# Patient Record
(demographics unavailable — no encounter records)

---

## 2025-04-07 NOTE — PROCEDURE
[FreeTextEntry3] : Ultrasound left breast  Patient reports left nipple discharge  Targeted exam demonstrates a dilated retroareolar duct with debris  Core biopsy was recommended  BI-RADS 4

## 2025-04-07 NOTE — ASSESSMENT
[FreeTextEntry1] : Left nipple discharge which occurs spontaneously  Patient assisted in scheduling breast MRI  She was asked to follow-up after the MRI for ultrasound-guided core biopsy of the dilated retroareolar duct and possible duct exploration  A total of 45 minutes was spent in consultation

## 2025-04-07 NOTE — HISTORY OF PRESENT ILLNESS
[FreeTextEntry1] : Denies pain, palpable masses Poly Cyst Ovary syndrome  she has nipple discharge for about 10 years on and off dark yellow/ light brown when on bra, clear-yellow when squeezing  recently the nipple discharge is only coming from the left breast  Last Mammogram/ Ultrasound 1/6/2025 at [facility]  Left Breast Cyst found   No family history for breast cancer  No Genetic Testing No Breast Biopsies/Surgeries   Referred by Dr Rand Clayton

## 2025-05-24 NOTE — HISTORY OF PRESENT ILLNESS
[FreeTextEntry1] :  patient returns to the office patient returns to the office for interval assessment  She has a history for left breast nipple discharge  Ultrasound demonstrates a dilated duct  MRI is unremarkable

## 2025-05-24 NOTE — ASSESSMENT
[FreeTextEntry1] : Left nipple discharge  Dilated retroareolar duct  Core biopsy was performed  Further recommendations will be made pending results of the biopsy  A total of 20 minutes was spent in consultation

## 2025-05-24 NOTE — PROCEDURE
[FreeTextEntry3] : Ultrasound-guided core biopsy retroareolar left breast duct  After informed consent was obtained, 1% lidocaine was infiltrated to the skin and multiple core biopsies of the dilated retroareolar duct were performed  No procedure related complications were noted

## 2025-05-24 NOTE — REASON FOR VISIT
[Procedure: _________] : a [unfilled] procedure visit [FreeTextEntry1] : ultrasound guided needle core biopsy of the dilated left duct